# Patient Record
(demographics unavailable — no encounter records)

---

## 2024-11-18 NOTE — HISTORY OF PRESENT ILLNESS
[de-identified] : LAYA LUIS is a 37 year F who presents today for an Annual Physical  Exam. She feels well but reported that sometimes feels like she needs to urinate despite having just emptied her bladder.  Denies dysuria. During the day - doesn't notice this- only bedtime. Denies nocturia.

## 2024-11-18 NOTE — HEALTH RISK ASSESSMENT
[Yes] : Yes [2 - 4 times a month (2 pts)] : 2-4 times a month (2 points) [1 or 2 (0 pts)] : 1 or 2 (0 points) [No] : In the past 12 months have you used drugs other than those required for medical reasons? No [0] : 2) Feeling down, depressed, or hopeless: Not at all (0) [PHQ-2 Negative - No further assessment needed] : PHQ-2 Negative - No further assessment needed [Never] : Never [Patient reported PAP Smear was normal] : Patient reported PAP Smear was normal [HIV Test offered] : HIV Test offered [Hepatitis C test offered] : Hepatitis C test offered [# of Members in Household ___] :  household currently consist of [unfilled] member(s) [# Of Children ___] : has [unfilled] children [Fully functional (bathing, dressing, toileting, transferring, walking, feeding)] : Fully functional (bathing, dressing, toileting, transferring, walking, feeding) [Fully functional (using the telephone, shopping, preparing meals, housekeeping, doing laundry, using] : Fully functional and needs no help or supervision to perform IADLs (using the telephone, shopping, preparing meals, housekeeping, doing laundry, using transportation, managing medications and managing finances) [Smoke Detector] : smoke detector [Carbon Monoxide Detector] : carbon monoxide detector [Designated Healthcare Proxy] : Designated healthcare proxy [Name: ___] : Health Care Proxy's Name: [unfilled]  [Relationship: ___] : Relationship: [unfilled] [Audit-CScore] : 2 [de-identified] : walks on treadmill, light weights [de-identified] : Pescatarian [[id number]] : 0 [PapSmearDate] : 1/30/24 [PapSmearComments] : Dr. 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

## 2024-11-18 NOTE — PHYSICAL EXAM
[No Abdominal Bruit] : a ~M bruit was not heard ~T in the abdomen [No Edema] : there was no peripheral edema [Normal Bowel Sounds] : normal bowel sounds [Normal Supraclavicular Nodes] : no supraclavicular lymphadenopathy [Normal Anterior Cervical Nodes] : no anterior cervical lymphadenopathy [Normal] : no rash [No Focal Deficits] : no focal deficits [Speech Grossly Normal] : speech grossly normal [Alert and Oriented x3] : oriented to person, place, and time [Normal Mood] : the mood was normal

## 2024-12-06 NOTE — ASSESSMENT
[FreeTextEntry1] :  RIGHT LOW FREQUENCY MILD HL MENIERS DISCUSSED LOW SALT DIET ANTIOXIDANT RICH DIET ECOG F/U AFTER ABOVE CONTINUE MEDROL

## 2024-12-06 NOTE — HISTORY OF PRESENT ILLNESS
[de-identified] : RIGHT EAR FULLNESS FEELING X 1 DAY SIMILAR EPISODE IN TH [facility] HX REVIEWED